# Patient Record
Sex: MALE | Race: BLACK OR AFRICAN AMERICAN | NOT HISPANIC OR LATINO | Employment: UNEMPLOYED | URBAN - METROPOLITAN AREA
[De-identification: names, ages, dates, MRNs, and addresses within clinical notes are randomized per-mention and may not be internally consistent; named-entity substitution may affect disease eponyms.]

---

## 2023-07-25 ENCOUNTER — HOSPITAL ENCOUNTER (EMERGENCY)
Facility: MEDICAL CENTER | Age: 23
End: 2023-07-25
Attending: EMERGENCY MEDICINE

## 2023-07-25 ENCOUNTER — PHARMACY VISIT (OUTPATIENT)
Dept: PHARMACY | Facility: MEDICAL CENTER | Age: 23
End: 2023-07-25
Payer: COMMERCIAL

## 2023-07-25 VITALS
WEIGHT: 171.52 LBS | RESPIRATION RATE: 16 BRPM | HEART RATE: 81 BPM | OXYGEN SATURATION: 96 % | TEMPERATURE: 98.5 F | SYSTOLIC BLOOD PRESSURE: 129 MMHG | DIASTOLIC BLOOD PRESSURE: 67 MMHG

## 2023-07-25 DIAGNOSIS — H61.91 EARLOBE LESION, RIGHT: ICD-10-CM

## 2023-07-25 PROCEDURE — 99282 EMERGENCY DEPT VISIT SF MDM: CPT

## 2023-07-25 PROCEDURE — RXMED WILLOW AMBULATORY MEDICATION CHARGE: Performed by: EMERGENCY MEDICINE

## 2023-07-25 RX ORDER — CLINDAMYCIN HYDROCHLORIDE 300 MG/1
300 CAPSULE ORAL 3 TIMES DAILY
Qty: 30 CAPSULE | Refills: 0 | Status: ACTIVE | OUTPATIENT
Start: 2023-07-25 | End: 2023-08-04

## 2023-07-25 NOTE — ED PROVIDER NOTES
ED Provider Note    CHIEF COMPLAINT  Chief Complaint   Patient presents with    Other     R ear pain x months. Prior growth to R ear x years.          HPI/ROS    David Florez is a 22 y.o. male who presents with pain and swelling to the right earlobe.  The patient has had a growth on the right earlobe for quite some time.  Recently it seems to be getting bigger with discomfort.  The patient does not have any associated fevers.  The patient has not had any weight loss.  The discomfort and growth is localized to the inferior aspect of the earlobe.    PAST MEDICAL HISTORY       SURGICAL HISTORY  patient denies any surgical history    FAMILY HISTORY  History reviewed. No pertinent family history.    SOCIAL HISTORY  Social History     Tobacco Use    Smoking status: Never    Smokeless tobacco: Never   Substance and Sexual Activity    Alcohol use: Never    Drug use: Never    Sexual activity: Not on file       CURRENT MEDICATIONS  Home Medications    **Home medications have not yet been reviewed for this encounter**         ALLERGIES  No Known Allergies    PHYSICAL EXAM  VITAL SIGNS: /72   Pulse (!) 104   Temp 36.9 °C (98.5 °F) (Temporal)   Resp 16   Wt 77.8 kg (171 lb 8.3 oz)   SpO2 94%    In general the patient does not appear toxic    Facial examination atraumatic with no swelling    Ears the right earlobe does have a soft tissue mass that appears to be a granuloma from a past piercing.  The patient is confident there is no piercing present in the soft tissue mass.  There is no purulent drainage.  The tympanic membrane on the right is intact.    Neck is supple without adenopathy      COURSE & MEDICAL DECISION MAKING    This a 22-year-old male who presents the emergency department the right earlobe mass.  This will require ENT consultation and possible resection versus biopsy for definitive diagnosis.  Due to the change in the pain and some swelling we will treat the patient for possible infection with  clindamycin.  The patient will take anti-inflammatories as needed for pain control.    FINAL DIAGNOSIS  Right earlobe mass    Disposition  The patient will be discharged in stable condition       Electronically signed by: Santo Abel M.D., 7/25/2023 10:11 AM

## 2023-07-25 NOTE — DISCHARGE INSTRUCTIONS
Take Motrin as needed for discomfort.  Follow-up with ENT within the next week.  Return to the emergency department if you are acutely worse.

## 2023-07-25 NOTE — ED TRIAGE NOTES
Chief Complaint   Patient presents with    Other     R ear pain x months. Prior growth to R ear x years.         /72   Pulse (!) 104   Temp 36.9 °C (98.5 °F) (Temporal)   Resp 16   SpO2 94%